# Patient Record
Sex: FEMALE | Race: WHITE | Employment: OTHER | URBAN - METROPOLITAN AREA
[De-identification: names, ages, dates, MRNs, and addresses within clinical notes are randomized per-mention and may not be internally consistent; named-entity substitution may affect disease eponyms.]

---

## 2017-01-26 PROBLEM — G56.02 CARPAL TUNNEL SYNDROME OF LEFT WRIST: Status: ACTIVE | Noted: 2017-01-26

## 2017-01-26 PROBLEM — M18.12 PRIMARY OSTEOARTHRITIS OF FIRST CARPOMETACARPAL JOINT OF LEFT HAND: Status: ACTIVE | Noted: 2017-01-26

## 2017-03-26 ENCOUNTER — OFFICE VISIT (OUTPATIENT)
Dept: FAMILY MEDICINE CLINIC | Facility: CLINIC | Age: 82
End: 2017-03-26

## 2017-03-26 VITALS
TEMPERATURE: 99 F | HEIGHT: 61 IN | BODY MASS INDEX: 30.21 KG/M2 | SYSTOLIC BLOOD PRESSURE: 136 MMHG | OXYGEN SATURATION: 98 % | HEART RATE: 68 BPM | RESPIRATION RATE: 12 BRPM | WEIGHT: 160 LBS | DIASTOLIC BLOOD PRESSURE: 84 MMHG

## 2017-03-26 DIAGNOSIS — J01.00 ACUTE MAXILLARY SINUSITIS, RECURRENCE NOT SPECIFIED: Primary | ICD-10-CM

## 2017-03-26 PROCEDURE — 99203 OFFICE O/P NEW LOW 30 MIN: CPT | Performed by: NURSE PRACTITIONER

## 2017-03-26 RX ORDER — FLUTICASONE PROPIONATE 50 MCG
2 SPRAY, SUSPENSION (ML) NASAL DAILY
Qty: 1 BOTTLE | Refills: 0 | Status: SHIPPED | OUTPATIENT
Start: 2017-03-26 | End: 2017-09-06

## 2017-03-26 RX ORDER — CEPHALEXIN 500 MG/1
CAPSULE ORAL
Refills: 0 | COMMUNITY
Start: 2017-03-22 | End: 2017-09-06

## 2017-03-26 RX ORDER — CEFDINIR 300 MG/1
300 CAPSULE ORAL 2 TIMES DAILY
Qty: 20 CAPSULE | Refills: 0 | Status: SHIPPED | OUTPATIENT
Start: 2017-03-26 | End: 2017-04-05 | Stop reason: ALTCHOICE

## 2017-03-26 NOTE — PROGRESS NOTES
CHIEF COMPLAINT:   Patient presents with:  Nasal Congestion  Cough      HPI:   Roderick Marroquin is a 80year old female who presents for sinus congestion for  4  days. Symptoms have been worsening since onset.  Sinus congestion/pain is described as a pressure Comment left shoulder replacement     OTHER SURGICAL HISTORY  1/2013    Comment right shoulder replaceemnt     KNEE REPLACEMENT SURGERY      Comment left  1999  right 2010      HIP SURGERY Right 7/8/16--Dr. Mary Clemons    Comment total hip replacement      Family Meds & Refills for this Visit:    Signed Prescriptions Disp Refills    Fluticasone Propionate 50 MCG/ACT Nasal Suspension 1 Bottle 0      Si sprays by Each Nare route daily.       cefdinir 300 MG Oral Cap 20 capsule 0      Sig: Take 1 capsule (300 mg · Over-the-counter decongestants may be used unless a similar medicine was prescribed. Nasal sprays work the fastest. Use one that contains phenylephrine or oxymetazoline. First blow the nose gently. Then use the spray.  Do not use these medicines more ofte © 1395-6552 The [address] [place] [address], [address]. All rights reserved. This information is not intended as a substitute for professional medical care. Always follow your healthcare professional's instructions.             The

## 2018-12-21 PROCEDURE — 82570 ASSAY OF URINE CREATININE: CPT | Performed by: FAMILY MEDICINE

## 2018-12-21 PROCEDURE — 82043 UR ALBUMIN QUANTITATIVE: CPT | Performed by: FAMILY MEDICINE

## 2018-12-21 PROCEDURE — 81001 URINALYSIS AUTO W/SCOPE: CPT | Performed by: FAMILY MEDICINE

## 2018-12-27 PROCEDURE — 81001 URINALYSIS AUTO W/SCOPE: CPT | Performed by: FAMILY MEDICINE

## 2019-02-06 PROCEDURE — 81003 URINALYSIS AUTO W/O SCOPE: CPT | Performed by: FAMILY MEDICINE

## 2019-03-12 ENCOUNTER — TELEPHONE (OUTPATIENT)
Dept: SURGERY | Facility: CLINIC | Age: 84
End: 2019-03-12

## 2019-03-19 NOTE — TELEPHONE ENCOUNTER
Pts daughter scheduled NP appt 3/20 with Dr Anderson Hernandez for knee pain-self referred, NP paperwork endorsed to [facility] bush folder in 2 [facility]

## 2019-03-20 ENCOUNTER — OFFICE VISIT (OUTPATIENT)
Dept: PAIN CLINIC | Facility: CLINIC | Age: 84
End: 2019-03-20
Payer: MEDICARE

## 2019-03-20 ENCOUNTER — TELEPHONE (OUTPATIENT)
Dept: PAIN CLINIC | Facility: CLINIC | Age: 84
End: 2019-03-20

## 2019-03-20 VITALS
BODY MASS INDEX: 30.21 KG/M2 | SYSTOLIC BLOOD PRESSURE: 160 MMHG | OXYGEN SATURATION: 97 % | HEART RATE: 70 BPM | DIASTOLIC BLOOD PRESSURE: 90 MMHG | WEIGHT: 160 LBS | HEIGHT: 61 IN

## 2019-03-20 DIAGNOSIS — M16.12 OSTEOARTHRITIS OF LEFT HIP, UNSPECIFIED OSTEOARTHRITIS TYPE: Primary | ICD-10-CM

## 2019-03-20 DIAGNOSIS — M17.12 OSTEOARTHRITIS OF LEFT KNEE, UNSPECIFIED OSTEOARTHRITIS TYPE: ICD-10-CM

## 2019-03-20 PROCEDURE — 99204 OFFICE O/P NEW MOD 45 MIN: CPT | Performed by: ANESTHESIOLOGY

## 2019-03-20 RX ORDER — ACETAMINOPHEN 500 MG
500 TABLET ORAL EVERY 6 HOURS PRN
Status: ON HOLD | COMMUNITY
End: 2019-07-15

## 2019-03-20 NOTE — PROGRESS NOTES
PHYSICAL EXAM:   Physical Exam  .    New patient here c/o left hip and left knee pain (knee replacement in 1999). Reported pain level of 4/10. Patient in room with daughter and , ok to hear PHI.     Location of Pain:     Date Pain Began:

## 2019-03-20 NOTE — PATIENT INSTRUCTIONS
Refill policies:    • Allow 2-3 business days for refills; controlled substances may take longer.   • Contact your pharmacy at least 5 days prior to running out of medication and have them send an electronic request or submit request through the “request re been approved by your insurer. Depending on your insurance carrier, approval may take 3-10 days. It is highly recommended patients contact their insurance carrier directly to determine coverage.   If test is done without insurance authorization, patient ma SEDATION        Appointment Date: 4/16/19,4/23/19   Check-In Time: 7:00 am for both    ** TO AVOID CANCELLATION AND/OR RESCHEDULING: PLEASE CALL [facility] PRE-PROCEDURE LINE -[phone] FOR DETAILED INSTRUCTIONS FIVE TO SEVEN DAYS PRIOR TO PROCEDURE** off for procedure      Medication:   Number of days you need to be off for the following medications:  • Aggrenox 10 days   • Agrylin (Anagrelide) 10 days   • Enbrel (Etanercept) 24 hours   • Fragmin (Dalteparin) 24 hours   • Pletal (Cilostazol) 7 days  • your blood sugar. Contact your primary care physician if your blood sugar rises as you may require some medication adjustment.         It is normal to have increased pain at injection site for up to 3-5 days after procedure, you can        use heat or ice

## 2019-03-20 NOTE — TELEPHONE ENCOUNTER
Holds placed on OR schedule. Please place cases with sedation.     Left hip joint: 4/16/19 at 0800    Left genicular nerve: 4/23/19 at 0800

## 2019-03-20 NOTE — PROGRESS NOTES
Spoke with patient and scheduled injections. Reviewed pre-op instructions. Patient verbalized understanding, no further questions at this time. Pre-op instructions provided to pt and pt's daughter in office.

## 2019-03-25 ENCOUNTER — TELEPHONE (OUTPATIENT)
Dept: SURGERY | Facility: CLINIC | Age: 84
End: 2019-03-25

## 2019-03-25 RX ORDER — DIAZEPAM 2 MG/1
TABLET ORAL
Qty: 2 TABLET | Refills: 0 | Status: SHIPPED
Start: 2019-03-25 | End: 2019-06-06 | Stop reason: ALTCHOICE

## 2019-03-25 NOTE — TELEPHONE ENCOUNTER
I have printed a script for valium 2mg, please call into the pharmacy. She will need to have a  taking her to and from the MRI.   Thanks

## 2019-03-25 NOTE — TELEPHONE ENCOUNTER
patient has made appointment for MRI for Wednesday 3/27/2019 @ 3:15 needs to have valium called in so she can take before the imaging.   Wants it called in to [facility] in [facility]

## 2019-03-25 NOTE — TELEPHONE ENCOUNTER
Spoke to pt to provide instructions for valium prior to MRI and need for a . Pt verbalized understanding.

## 2019-04-02 ENCOUNTER — TELEPHONE (OUTPATIENT)
Dept: SURGERY | Facility: CLINIC | Age: 84
End: 2019-04-02

## 2019-04-02 NOTE — TELEPHONE ENCOUNTER
Pt's daughter states pt completed MRI, report will be sent from [facility] via fax; daughter would like Dr Farideh Goetz to review MRI report & advise if he feels injections are still necessary once reviewed.  Daughter asking for call back w/provider's feedb

## 2019-04-04 NOTE — TELEPHONE ENCOUNTER
MRI left hip shows the followin. Severe joint space narrowing; osteoarthritis to the joint space  2. Labral tears; several surrounding the joint  3. Muscle tendonitis: gluteus medius  4. Mild greater trochanteric bursitis  5.  Partial tearing of conj

## 2019-04-04 NOTE — TELEPHONE ENCOUNTER
Daughter calling asking status of reviewing MRI. Does not want to bring mother if she is unable to get injections.   Please call daughter [phone]

## 2019-04-04 NOTE — TELEPHONE ENCOUNTER
Patient's daughter was notified of results and recommendations below. She verbalized understanding and was very appreciative of call.      Results sent to scanning

## 2019-04-05 NOTE — PROGRESS NOTES
Name: Princess Valle   : 3/4/1935   DOS: 3/20/2019     Chief complaint: Low back pain, left hip and knee joint pain    History of present illness:  Princess Valle is a 80year old female complaining of  pain in the lower back for many years but got signif diazepam 2 MG Oral Tab Take one tablet at bedtime the night prior to test, may repeat 45 minutes prior to test. Disp: 2 tablet Rfl: 0   amLODIPine Besylate 5 MG Oral Tab Take 1 tablet (5 mg total) by mouth daily.  Disp: 90 tablet Rfl: 3   cephALEXin 500 M problems. Vascular: Negative. Specifically denies phlebitis, DVT, PE, bleeding problems, hemophilia or any other vascular problems. Dermatology: Negative for all skin problems. Hematolgy/Lymph: negative for all the hematological problems.   Neuropsychiat area.  But severe tenderness over the left hip and left knee joint area. Flexion of the spine makes the pain better, extension and lateral rotation of the spine makes the pain worse.  Ronnies, thigh thrust, the [place] or [place], SI joint compression, [facility]

## 2019-04-11 ENCOUNTER — TELEPHONE (OUTPATIENT)
Dept: SURGERY | Facility: CLINIC | Age: 84
End: 2019-04-11

## 2019-04-11 NOTE — TELEPHONE ENCOUNTER
Spoke to patient, confirmed procedure date of 4/16/19 and to be checked in at outpatient registration at 7:00 am. Patient instructed to call pre-procedure line before procedure at [phone].  Patient instructed to call office if there are additional ques

## 2019-04-15 NOTE — TELEPHONE ENCOUNTER
Patient states she had a dental procedure 7 weeks ago, will this be a problem? Per Pain Service, no this is not an issue for tomorrow's injection. Pt informed.

## 2019-04-16 ENCOUNTER — HOSPITAL ENCOUNTER (OUTPATIENT)
Facility: HOSPITAL | Age: 84
Setting detail: HOSPITAL OUTPATIENT SURGERY
Discharge: HOME | End: 2019-04-16
Attending: ANESTHESIOLOGY | Admitting: ANESTHESIOLOGY
Payer: MEDICARE

## 2019-04-16 ENCOUNTER — APPOINTMENT (OUTPATIENT)
Dept: GENERAL RADIOLOGY | Facility: HOSPITAL | Age: 84
End: 2019-04-16
Attending: ANESTHESIOLOGY
Payer: MEDICARE

## 2019-04-16 ENCOUNTER — TELEPHONE (OUTPATIENT)
Dept: PAIN CLINIC | Facility: CLINIC | Age: 84
End: 2019-04-16

## 2019-04-16 VITALS
RESPIRATION RATE: 18 BRPM | SYSTOLIC BLOOD PRESSURE: 105 MMHG | OXYGEN SATURATION: 95 % | TEMPERATURE: 98 F | DIASTOLIC BLOOD PRESSURE: 47 MMHG | HEART RATE: 65 BPM

## 2019-04-16 DIAGNOSIS — M16.12 OSTEOARTHRITIS OF LEFT HIP, UNSPECIFIED OSTEOARTHRITIS TYPE: ICD-10-CM

## 2019-04-16 PROCEDURE — 3E0U3BZ INTRODUCTION OF ANESTHETIC AGENT INTO JOINTS, PERCUTANEOUS APPROACH: ICD-10-PCS | Performed by: ANESTHESIOLOGY

## 2019-04-16 PROCEDURE — 77002 NEEDLE LOCALIZATION BY XRAY: CPT | Performed by: ANESTHESIOLOGY

## 2019-04-16 PROCEDURE — 99152 MOD SED SAME PHYS/QHP 5/>YRS: CPT | Performed by: ANESTHESIOLOGY

## 2019-04-16 PROCEDURE — 3E0U33Z INTRODUCTION OF ANTI-INFLAMMATORY INTO JOINTS, PERCUTANEOUS APPROACH: ICD-10-PCS | Performed by: ANESTHESIOLOGY

## 2019-04-16 RX ORDER — SODIUM CHLORIDE, SODIUM LACTATE, POTASSIUM CHLORIDE, CALCIUM CHLORIDE 600; 310; 30; 20 MG/100ML; MG/100ML; MG/100ML; MG/100ML
100 INJECTION, SOLUTION INTRAVENOUS CONTINUOUS
Status: DISCONTINUED | OUTPATIENT
Start: 2019-04-16 | End: 2019-04-16

## 2019-04-16 RX ORDER — LIDOCAINE HYDROCHLORIDE 10 MG/ML
INJECTION, SOLUTION EPIDURAL; INFILTRATION; INTRACAUDAL; PERINEURAL AS NEEDED
Status: DISCONTINUED | OUTPATIENT
Start: 2019-04-16 | End: 2019-04-16

## 2019-04-16 RX ORDER — METHYLPREDNISOLONE ACETATE 40 MG/ML
INJECTION, SUSPENSION INTRA-ARTICULAR; INTRALESIONAL; INTRAMUSCULAR; SOFT TISSUE AS NEEDED
Status: DISCONTINUED | OUTPATIENT
Start: 2019-04-16 | End: 2019-04-16

## 2019-04-16 RX ORDER — ONDANSETRON 2 MG/ML
4 INJECTION INTRAMUSCULAR; INTRAVENOUS ONCE AS NEEDED
Status: DISCONTINUED | OUTPATIENT
Start: 2019-04-16 | End: 2019-04-16

## 2019-04-16 RX ORDER — MIDAZOLAM HYDROCHLORIDE 1 MG/ML
INJECTION INTRAMUSCULAR; INTRAVENOUS AS NEEDED
Status: DISCONTINUED | OUTPATIENT
Start: 2019-04-16 | End: 2019-04-16

## 2019-04-16 RX ORDER — DIPHENHYDRAMINE HYDROCHLORIDE 50 MG/ML
50 INJECTION INTRAMUSCULAR; INTRAVENOUS ONCE AS NEEDED
Status: DISCONTINUED | OUTPATIENT
Start: 2019-04-16 | End: 2019-04-16

## 2019-04-16 NOTE — TELEPHONE ENCOUNTER
Patient brought in MRI of left hip; advised pt and family I will upload it to PACS system tomorrow, and mail back once completed. Pt has scheduled injection next week.

## 2019-04-16 NOTE — H&P
History & Physical Examination    Patient Name: Princess Valle  MRN: [id number]  CSN: [id number]  YOB: 1935    Pre-Operative Diagnosis:  Osteoarthritis of left hip, unspecified osteoarthritis type [M16.12]    Present Illness: A 80year old femal of blood transfusion     with knee replacement 2010   • Hyperlipidemia    • Obesity    • Osteoarthritis      Past Surgical History:   Procedure Laterality Date   • ANESTH,SURGERY OF SHOULDER Bilateral    • CARPAL TUNNEL RELEASE Right    • COLONOSCOPY     •

## 2019-04-16 NOTE — OPERATIVE REPORT
[facility]  Operative Report  2019     Magen Miller Patient Status:  Hospital Outpatient Surgery    3/4/1935 MRN [id number]   [facility] ENDOSCOPY Attending No att. providers found   Hosp Day # 0 PCP Jeanna Rivas MD     Indicat the procedure very well. The patient had complete understanding of the risks and benefits of the procedure. Complications: None. Follow up: The patient will be followed in the pain clinic as needed basis.     Ruchi Prater MD

## 2019-04-18 ENCOUNTER — TELEPHONE (OUTPATIENT)
Dept: SURGERY | Facility: CLINIC | Age: 84
End: 2019-04-18

## 2019-04-18 NOTE — TELEPHONE ENCOUNTER
Pt would like to know if she needs to make any f/us since she is doing so well?  Please advise patient

## 2019-04-18 NOTE — TELEPHONE ENCOUNTER
Spoke to pt who states left knee is feeling better and feels pain was related to the hip issues she was having.  Asked pt if she would like to have procedure removed from OR schedule today or if she would prefer to wait until 4/19/19 or 4/22/19 to advise if

## 2019-04-18 NOTE — TELEPHONE ENCOUNTER
Left message for patient to call back to confirm procedure date of 4/23/19 with Dr Deana Anglin and to be checked in at outpatient registration at 7:00 am. Patient to be instructed to call pre-procedure line before procedure at [phone].  Patient to be instruct

## 2019-04-22 ENCOUNTER — TELEPHONE (OUTPATIENT)
Dept: SURGERY | Facility: CLINIC | Age: 84
End: 2019-04-22

## 2019-05-01 ENCOUNTER — OFFICE VISIT (OUTPATIENT)
Dept: PAIN CLINIC | Facility: CLINIC | Age: 84
End: 2019-05-01
Payer: MEDICARE

## 2019-05-01 VITALS
BODY MASS INDEX: 29.83 KG/M2 | DIASTOLIC BLOOD PRESSURE: 78 MMHG | WEIGHT: 158 LBS | SYSTOLIC BLOOD PRESSURE: 142 MMHG | HEIGHT: 61 IN | OXYGEN SATURATION: 98 % | HEART RATE: 71 BPM

## 2019-05-01 DIAGNOSIS — M16.12 PRIMARY OSTEOARTHRITIS OF LEFT HIP: Primary | ICD-10-CM

## 2019-05-01 PROCEDURE — 99214 OFFICE O/P EST MOD 30 MIN: CPT | Performed by: ANESTHESIOLOGY

## 2019-05-04 NOTE — PROGRESS NOTES
Name: Ro Banks   : 3/4/1935   DOS: 2019     Pain Clinic Follow Up Visit:   Ro Banks is a 80year old female who presents for recheck of her chronic hip pain. She is status post left hip joint injection.   She had complete pain relief for 3 (two) times daily.  Disp: 14 capsule Rfl: 0         EXAM:   /78 (BP Location: Right arm, Patient Position: Sitting, Cuff Size: adult)   Pulse 71   Ht 61\"   Wt 158 lb   SpO2 98%   BMI 29.85 kg/m²   Moderate tenderness over the left hip joint lumbar pa

## 2019-05-09 PROBLEM — M16.12 PRIMARY OSTEOARTHRITIS OF LEFT HIP: Status: ACTIVE | Noted: 2019-05-09

## 2019-06-24 ENCOUNTER — LABORATORY ENCOUNTER (OUTPATIENT)
Dept: LAB | Age: 84
End: 2019-06-24
Attending: ORTHOPAEDIC SURGERY
Payer: MEDICARE

## 2019-06-24 DIAGNOSIS — M16.12 PRIMARY OSTEOARTHRITIS OF LEFT HIP: ICD-10-CM

## 2019-06-24 PROCEDURE — 87081 CULTURE SCREEN ONLY: CPT

## 2019-06-24 PROCEDURE — 86901 BLOOD TYPING SEROLOGIC RH(D): CPT

## 2019-06-24 PROCEDURE — 86850 RBC ANTIBODY SCREEN: CPT

## 2019-06-24 PROCEDURE — 86900 BLOOD TYPING SEROLOGIC ABO: CPT

## 2019-06-26 PROCEDURE — 87086 URINE CULTURE/COLONY COUNT: CPT | Performed by: FAMILY MEDICINE

## 2019-06-26 PROCEDURE — 81001 URINALYSIS AUTO W/SCOPE: CPT | Performed by: FAMILY MEDICINE

## 2019-07-09 NOTE — CM/SW NOTE
Call from pt re: surgery 7/15 and need to go to rehab at d/c. She notes that her  is disabled and cannot help her. She had gone to [facility] in [facility] 3 yrs ago after surgery and this would be her preference again.   SW discussed that in order for M

## 2019-07-14 ENCOUNTER — ANESTHESIA EVENT (OUTPATIENT)
Dept: SURGERY | Facility: HOSPITAL | Age: 84
DRG: 470 | End: 2019-07-14
Payer: MEDICARE

## 2019-07-15 ENCOUNTER — ANESTHESIA (OUTPATIENT)
Dept: SURGERY | Facility: HOSPITAL | Age: 84
DRG: 470 | End: 2019-07-15
Payer: MEDICARE

## 2019-07-15 ENCOUNTER — APPOINTMENT (OUTPATIENT)
Dept: GENERAL RADIOLOGY | Facility: HOSPITAL | Age: 84
DRG: 470 | End: 2019-07-15
Attending: ORTHOPAEDIC SURGERY
Payer: MEDICARE

## 2019-07-15 ENCOUNTER — HOSPITAL ENCOUNTER (INPATIENT)
Facility: HOSPITAL | Age: 84
LOS: 2 days | Discharge: NURSING, REHAB OR LONG-TERM CARE | DRG: 470 | End: 2019-07-17
Attending: ORTHOPAEDIC SURGERY | Admitting: ORTHOPAEDIC SURGERY
Payer: MEDICARE

## 2019-07-15 DIAGNOSIS — Z96.652 HISTORY OF TOTAL KNEE ARTHROPLASTY, LEFT: ICD-10-CM

## 2019-07-15 DIAGNOSIS — M16.12 PRIMARY OSTEOARTHRITIS OF LEFT HIP: Primary | ICD-10-CM

## 2019-07-15 PROCEDURE — 97161 PT EVAL LOW COMPLEX 20 MIN: CPT

## 2019-07-15 PROCEDURE — 3E0T3BZ INTRODUCTION OF ANESTHETIC AGENT INTO PERIPHERAL NERVES AND PLEXI, PERCUTANEOUS APPROACH: ICD-10-PCS | Performed by: ANESTHESIOLOGY

## 2019-07-15 PROCEDURE — 88311 DECALCIFY TISSUE: CPT | Performed by: ORTHOPAEDIC SURGERY

## 2019-07-15 PROCEDURE — 0SRB01A REPLACEMENT OF LEFT HIP JOINT WITH METAL SYNTHETIC SUBSTITUTE, UNCEMENTED, OPEN APPROACH: ICD-10-PCS | Performed by: ORTHOPAEDIC SURGERY

## 2019-07-15 PROCEDURE — 76000 FLUOROSCOPY <1 HR PHYS/QHP: CPT | Performed by: ORTHOPAEDIC SURGERY

## 2019-07-15 PROCEDURE — 88304 TISSUE EXAM BY PATHOLOGIST: CPT | Performed by: ORTHOPAEDIC SURGERY

## 2019-07-15 PROCEDURE — 76942 ECHO GUIDE FOR BIOPSY: CPT | Performed by: ORTHOPAEDIC SURGERY

## 2019-07-15 PROCEDURE — 97530 THERAPEUTIC ACTIVITIES: CPT

## 2019-07-15 DEVICE — PINNACLE POROCOAT ACETABULAR SHELL SECTOR II 48MM OD
Type: IMPLANTABLE DEVICE | Site: HIP | Status: FUNCTIONAL
Brand: PINNACLE POROCOAT

## 2019-07-15 DEVICE — CORAIL HIP SYSTEM CEMENTLESS FEMORAL STEM 12/14 AMT 125 DEGREES KLA SIZE 10 HA COATED HIGH OFFSET COLLAR
Type: IMPLANTABLE DEVICE | Site: HIP | Status: FUNCTIONAL
Brand: CORAIL

## 2019-07-15 DEVICE — PINNACLE HIP SOLUTIONS ALTRX POLYETHYLENE ACETABULAR LINER NEUTRAL 32MM ID 48MM OD
Type: IMPLANTABLE DEVICE | Site: HIP | Status: FUNCTIONAL
Brand: PINNACLE ALTRX

## 2019-07-15 DEVICE — ARTICUL/EZE FEMORAL HEAD DIAMETER 32MM +1 12/14 TAPER
Type: IMPLANTABLE DEVICE | Site: HIP | Status: FUNCTIONAL
Brand: ARTICUL/EZE

## 2019-07-15 RX ORDER — ACETAMINOPHEN 325 MG/1
TABLET ORAL
Status: COMPLETED
Start: 2019-07-15 | End: 2019-07-15

## 2019-07-15 RX ORDER — ATORVASTATIN CALCIUM 20 MG/1
20 TABLET, FILM COATED ORAL
Status: DISCONTINUED | OUTPATIENT
Start: 2019-07-18 | End: 2019-07-17

## 2019-07-15 RX ORDER — DOCUSATE SODIUM 100 MG/1
100 CAPSULE, LIQUID FILLED ORAL 2 TIMES DAILY
Qty: 60 CAPSULE | Refills: 0 | Status: SHIPPED | OUTPATIENT
Start: 2019-07-15 | End: 2019-09-25 | Stop reason: ALTCHOICE

## 2019-07-15 RX ORDER — CARVEDILOL 6.25 MG/1
6.25 TABLET ORAL 2 TIMES DAILY WITH MEALS
Status: DISCONTINUED | OUTPATIENT
Start: 2019-07-15 | End: 2019-07-17

## 2019-07-15 RX ORDER — NALOXONE HYDROCHLORIDE 0.4 MG/ML
80 INJECTION, SOLUTION INTRAMUSCULAR; INTRAVENOUS; SUBCUTANEOUS AS NEEDED
Status: DISCONTINUED | OUTPATIENT
Start: 2019-07-15 | End: 2019-07-15 | Stop reason: HOSPADM

## 2019-07-15 RX ORDER — DIPHENHYDRAMINE HCL 25 MG
25 CAPSULE ORAL EVERY 4 HOURS PRN
Status: DISCONTINUED | OUTPATIENT
Start: 2019-07-15 | End: 2019-07-16

## 2019-07-15 RX ORDER — MIDAZOLAM HYDROCHLORIDE 1 MG/ML
1 INJECTION INTRAMUSCULAR; INTRAVENOUS EVERY 5 MIN PRN
Status: DISCONTINUED | OUTPATIENT
Start: 2019-07-15 | End: 2019-07-15 | Stop reason: HOSPADM

## 2019-07-15 RX ORDER — DOCUSATE SODIUM 100 MG/1
100 CAPSULE, LIQUID FILLED ORAL 2 TIMES DAILY
Status: DISCONTINUED | OUTPATIENT
Start: 2019-07-15 | End: 2019-07-17

## 2019-07-15 RX ORDER — HYDROMORPHONE HYDROCHLORIDE 1 MG/ML
0.4 INJECTION, SOLUTION INTRAMUSCULAR; INTRAVENOUS; SUBCUTANEOUS EVERY 5 MIN PRN
Status: DISCONTINUED | OUTPATIENT
Start: 2019-07-15 | End: 2019-07-15 | Stop reason: HOSPADM

## 2019-07-15 RX ORDER — SENNOSIDES 8.6 MG
17.2 TABLET ORAL NIGHTLY
Status: DISCONTINUED | OUTPATIENT
Start: 2019-07-15 | End: 2019-07-17

## 2019-07-15 RX ORDER — DIPHENHYDRAMINE HYDROCHLORIDE 50 MG/ML
12.5 INJECTION INTRAMUSCULAR; INTRAVENOUS EVERY 4 HOURS PRN
Status: DISCONTINUED | OUTPATIENT
Start: 2019-07-15 | End: 2019-07-16

## 2019-07-15 RX ORDER — ACETAMINOPHEN 500 MG
1000 TABLET ORAL ONCE
Status: DISCONTINUED | OUTPATIENT
Start: 2019-07-15 | End: 2019-07-15

## 2019-07-15 RX ORDER — CEFAZOLIN SODIUM/WATER 2 G/20 ML
2 SYRINGE (ML) INTRAVENOUS EVERY 8 HOURS
Status: COMPLETED | OUTPATIENT
Start: 2019-07-15 | End: 2019-07-16

## 2019-07-15 RX ORDER — POLYETHYLENE GLYCOL 3350 17 G/17G
17 POWDER, FOR SOLUTION ORAL DAILY PRN
Status: DISCONTINUED | OUTPATIENT
Start: 2019-07-15 | End: 2019-07-17

## 2019-07-15 RX ORDER — METOCLOPRAMIDE HYDROCHLORIDE 5 MG/ML
10 INJECTION INTRAMUSCULAR; INTRAVENOUS EVERY 6 HOURS PRN
Status: DISCONTINUED | OUTPATIENT
Start: 2019-07-15 | End: 2019-07-16

## 2019-07-15 RX ORDER — DIPHENHYDRAMINE HYDROCHLORIDE 50 MG/ML
12.5 INJECTION INTRAMUSCULAR; INTRAVENOUS AS NEEDED
Status: DISCONTINUED | OUTPATIENT
Start: 2019-07-15 | End: 2019-07-15 | Stop reason: HOSPADM

## 2019-07-15 RX ORDER — TIZANIDINE 2 MG/1
2 TABLET ORAL 3 TIMES DAILY PRN
Status: DISCONTINUED | OUTPATIENT
Start: 2019-07-15 | End: 2019-07-16

## 2019-07-15 RX ORDER — ONDANSETRON 2 MG/ML
4 INJECTION INTRAMUSCULAR; INTRAVENOUS EVERY 4 HOURS PRN
Status: DISCONTINUED | OUTPATIENT
Start: 2019-07-15 | End: 2019-07-17

## 2019-07-15 RX ORDER — OXYCODONE HYDROCHLORIDE 5 MG/1
5 TABLET ORAL EVERY 4 HOURS PRN
Status: DISCONTINUED | OUTPATIENT
Start: 2019-07-15 | End: 2019-07-16

## 2019-07-15 RX ORDER — HYDROMORPHONE HYDROCHLORIDE 1 MG/ML
0.4 INJECTION, SOLUTION INTRAMUSCULAR; INTRAVENOUS; SUBCUTANEOUS EVERY 2 HOUR PRN
Status: DISCONTINUED | OUTPATIENT
Start: 2019-07-15 | End: 2019-07-16

## 2019-07-15 RX ORDER — SODIUM CHLORIDE, SODIUM LACTATE, POTASSIUM CHLORIDE, CALCIUM CHLORIDE 600; 310; 30; 20 MG/100ML; MG/100ML; MG/100ML; MG/100ML
INJECTION, SOLUTION INTRAVENOUS CONTINUOUS
Status: DISCONTINUED | OUTPATIENT
Start: 2019-07-15 | End: 2019-07-17

## 2019-07-15 RX ORDER — OXYCODONE HYDROCHLORIDE 10 MG/1
10 TABLET ORAL EVERY 4 HOURS PRN
Status: DISCONTINUED | OUTPATIENT
Start: 2019-07-15 | End: 2019-07-16

## 2019-07-15 RX ORDER — SODIUM PHOSPHATE, DIBASIC AND SODIUM PHOSPHATE, MONOBASIC 7; 19 G/133ML; G/133ML
1 ENEMA RECTAL ONCE AS NEEDED
Status: DISCONTINUED | OUTPATIENT
Start: 2019-07-15 | End: 2019-07-17

## 2019-07-15 RX ORDER — SODIUM CHLORIDE, SODIUM LACTATE, POTASSIUM CHLORIDE, CALCIUM CHLORIDE 600; 310; 30; 20 MG/100ML; MG/100ML; MG/100ML; MG/100ML
INJECTION, SOLUTION INTRAVENOUS CONTINUOUS
Status: DISCONTINUED | OUTPATIENT
Start: 2019-07-15 | End: 2019-07-15 | Stop reason: HOSPADM

## 2019-07-15 RX ORDER — ACETAMINOPHEN 325 MG/1
650 TABLET ORAL ONCE
Status: COMPLETED | OUTPATIENT
Start: 2019-07-15 | End: 2019-07-15

## 2019-07-15 RX ORDER — PROCHLORPERAZINE EDISYLATE 5 MG/ML
10 INJECTION INTRAMUSCULAR; INTRAVENOUS EVERY 6 HOURS PRN
Status: DISCONTINUED | OUTPATIENT
Start: 2019-07-15 | End: 2019-07-17

## 2019-07-15 RX ORDER — CEFAZOLIN SODIUM/WATER 2 G/20 ML
2 SYRINGE (ML) INTRAVENOUS ONCE
Status: COMPLETED | OUTPATIENT
Start: 2019-07-15 | End: 2019-07-15

## 2019-07-15 RX ORDER — HYDROMORPHONE HYDROCHLORIDE 1 MG/ML
0.8 INJECTION, SOLUTION INTRAMUSCULAR; INTRAVENOUS; SUBCUTANEOUS EVERY 2 HOUR PRN
Status: DISCONTINUED | OUTPATIENT
Start: 2019-07-15 | End: 2019-07-16

## 2019-07-15 RX ORDER — BISACODYL 10 MG
10 SUPPOSITORY, RECTAL RECTAL
Status: DISCONTINUED | OUTPATIENT
Start: 2019-07-15 | End: 2019-07-17

## 2019-07-15 RX ORDER — HYDROMORPHONE HYDROCHLORIDE 1 MG/ML
0.2 INJECTION, SOLUTION INTRAMUSCULAR; INTRAVENOUS; SUBCUTANEOUS EVERY 2 HOUR PRN
Status: DISCONTINUED | OUTPATIENT
Start: 2019-07-15 | End: 2019-07-16

## 2019-07-15 RX ORDER — DIPHENHYDRAMINE HYDROCHLORIDE 50 MG/ML
25 INJECTION INTRAMUSCULAR; INTRAVENOUS ONCE AS NEEDED
Status: ACTIVE | OUTPATIENT
Start: 2019-07-15 | End: 2019-07-15

## 2019-07-15 RX ORDER — OXYCODONE HYDROCHLORIDE 15 MG/1
15 TABLET ORAL EVERY 4 HOURS PRN
Status: DISCONTINUED | OUTPATIENT
Start: 2019-07-15 | End: 2019-07-16

## 2019-07-15 RX ORDER — SODIUM CHLORIDE 9 MG/ML
INJECTION, SOLUTION INTRAVENOUS CONTINUOUS
Status: DISCONTINUED | OUTPATIENT
Start: 2019-07-15 | End: 2019-07-17

## 2019-07-15 RX ORDER — HYDROCODONE BITARTRATE AND ACETAMINOPHEN 5; 325 MG/1; MG/1
2 TABLET ORAL EVERY 4 HOURS PRN
Status: DISCONTINUED | OUTPATIENT
Start: 2019-07-16 | End: 2019-07-16

## 2019-07-15 RX ORDER — ATORVASTATIN CALCIUM 20 MG/1
20 TABLET, FILM COATED ORAL
COMMUNITY
End: 2020-02-25

## 2019-07-15 RX ORDER — CETIRIZINE HYDROCHLORIDE 10 MG/1
10 TABLET ORAL DAILY
Status: DISCONTINUED | OUTPATIENT
Start: 2019-07-15 | End: 2019-07-17

## 2019-07-15 RX ORDER — ONDANSETRON 2 MG/ML
4 INJECTION INTRAMUSCULAR; INTRAVENOUS AS NEEDED
Status: DISCONTINUED | OUTPATIENT
Start: 2019-07-15 | End: 2019-07-15 | Stop reason: HOSPADM

## 2019-07-15 RX ORDER — HYDROCODONE BITARTRATE AND ACETAMINOPHEN 5; 325 MG/1; MG/1
1 TABLET ORAL EVERY 4 HOURS PRN
Status: DISCONTINUED | OUTPATIENT
Start: 2019-07-16 | End: 2019-07-17

## 2019-07-15 RX ORDER — METOCLOPRAMIDE HYDROCHLORIDE 5 MG/ML
10 INJECTION INTRAMUSCULAR; INTRAVENOUS AS NEEDED
Status: DISCONTINUED | OUTPATIENT
Start: 2019-07-15 | End: 2019-07-15 | Stop reason: HOSPADM

## 2019-07-15 RX ORDER — AMLODIPINE BESYLATE 5 MG/1
5 TABLET ORAL DAILY
Status: DISCONTINUED | OUTPATIENT
Start: 2019-07-16 | End: 2019-07-17

## 2019-07-15 RX ORDER — ACETAMINOPHEN 325 MG/1
650 TABLET ORAL 4 TIMES DAILY
Status: DISPENSED | OUTPATIENT
Start: 2019-07-15 | End: 2019-07-16

## 2019-07-15 RX ORDER — HYDROCODONE BITARTRATE AND ACETAMINOPHEN 5; 325 MG/1; MG/1
1 TABLET ORAL EVERY 6 HOURS PRN
Qty: 40 TABLET | Refills: 0 | Status: SHIPPED | OUTPATIENT
Start: 2019-07-15 | End: 2019-08-23

## 2019-07-15 RX ORDER — MEPERIDINE HYDROCHLORIDE 25 MG/ML
12.5 INJECTION INTRAMUSCULAR; INTRAVENOUS; SUBCUTANEOUS AS NEEDED
Status: DISCONTINUED | OUTPATIENT
Start: 2019-07-15 | End: 2019-07-15 | Stop reason: HOSPADM

## 2019-07-15 RX ORDER — CARVEDILOL 6.25 MG/1
6.25 TABLET ORAL 2 TIMES DAILY WITH MEALS
COMMUNITY
End: 2019-09-11

## 2019-07-15 NOTE — H&P
Kenneth Go   6/26/2019 1:15 PM   Office Visit   MRN:  [id number]   Description: 80year old female Provider: Carol Merchant MD Department: [facility] Sheet     Click to print Kranthi Wynnee 708 for scanning     Office Visi Pre-Op Exam: Pt present for clearance for LT anterior Hip Replacement on 07/15/2019 by Dr Kishor Guzman at [facility] in [place].     /72   Pulse 73   Temp 98.2 °F (36.8 °C) (Oral)   Resp 16   Ht 5' 1\"   Wt 153 lb 6 oz   LMP  (LMP Unknown)   SpO2 93% Lab Results   Component Value Date     HDL 71 12/21/2018     HDL 72 06/15/2018     HDL 80 12/04/2017            Lab Results   Component Value Date     LDL 55 12/21/2018     LDL 62 06/15/2018     LDL 50 12/04/2017            Lab Results   Component Value Da CARVEDILOL 6.25 MG Oral Tab TAKE 1 TABLET BY MOUTH TWICE A DAY WITH FOOD Disp: 180 tablet Rfl: 0   ATORVASTATIN 20 MG Oral Tab TAKE 1 TABLET BY MOUTH TWICE A WEEK.  Disp: 30 tablet Rfl: 0   acetaminophen 500 MG Oral Tab Take 500 mg by mouth every 6 (six) ho • Hypertension Father     • Hypertension Mother     • Hypertension Brother         Social History    Tobacco Use      Smoking status: Never Smoker      Smokeless tobacco: Never Used    Alcohol use: No      Alcohol/week: 0.0 oz      Comment: very rare     Pre-op testing  -     CARD NUC STRESS TEST LEXISCAN (CPT=93015/10239/); Future     Microscopic hematuria        Multiple medical problems reviewed today. Patient should continue current medications as reviewed.   Common side effects of medications re The above referenced H&P was reviewed by Sebastián Martin MD on 7/15/2019, the patient was examined and no significant changes have occurred in the patient's condition since the H&P was performed.  I discussed with the patient and/or legal representative the po

## 2019-07-15 NOTE — OPERATIVE REPORT
[address] REPLACEMENT OPERATIVE REPORT    DATE OF SURGERY 7/15/2019    Lesia Jarquin       [id number]     3/4/1935    PRE-OP DX:  LEFT HIP PRIMARY OSTEOARTHRITIS  POST-OP DX:  LEFT HIP PRIMARY OSTEOARTHRITIS  PROCEDURE:  DIRECT ANTERIOR L DEGENERATIVE CHANGES WERE NOTED. FEMORAL NECK OSTEOTOMY WAS MADE. FEMORAL HEAD WAS REMOVED WITH A CORK SCREW. POSTERIOR AND INFERIOR ACETABULAR RETRACTORS WERE PLACED CAREFULLY. GOOD ACETABULAR EXPOSURE WAS OBTAINED. LABRAL TISSUE WAS EXCISED.   MED CONDITION. HIP WAS FLEXED TO 90 AND ADD/IR TO 45 DEG. HIP WAS FULLY EXTENDED AND ER TO 90. HIP WAS FELT TO BE STABLE WITH GOOD TENSION. ALL THE TRIAL IMPLANTS WERE REMOVED. WOUND WAS IRRIGATED COPIOUSLY. HEMOSTASIS WAS OBTAINED.   ACETABULUM WAS REEXP

## 2019-07-15 NOTE — CONSULTS
[facility] Hospitalist Initial Consult       Reason for consult: Medical Management sp ALINE      History of Present Illness: Patient is a 80year old female with PMH sig for HTN, HL  who presents sp ALINE.    They tolerated the procedure well without any immediate co shoulder replacement    • OTHER SURGICAL HISTORY  1/2013    right shoulder replaceemnt    • THYROIDECTOMY  1970's    partial      Social History    Tobacco Use      Smoking status: Never Smoker      Smokeless tobacco: Never Used    Alcohol use: No      Alc AST, ALB, AMYLASE, LIPASE, LDH in the last 168 hours. Invalid input(s): ALPHOS, TBIL, DBIL, TPROT    No results for input(s): PGLU in the last 168 hours. No results for input(s): TROP in the last 168 hours.     ASSESSMENT / PLAN:    sp TKA  - Plan per

## 2019-07-15 NOTE — PLAN OF CARE
Post op plan of care discussed at bedside this afternoon. Left hip aquacell dressing clean and dry. Gel ice in place. Scd's on. IVF infusing as ordered, post op bag completing. DTV. Tolerating water. Denies need for pain med at present.  Abd pillow between

## 2019-07-15 NOTE — ANESTHESIA POSTPROCEDURE EVALUATION
Senthil Chris Patient Status:  Surgery Admit - Inpt   Age/Gender 80year old female MRN [id number]   [facility] SURGERY Attending Marnia Hanna MD   Hosp Day # 0 PCP Mac Mcmahon MD       Anesthesia Post-op Note    Procedur

## 2019-07-15 NOTE — PROGRESS NOTES
Alicia Stevens   6/4/2019 2:20 PM   Office Visit   MRN:  [id number]   Description: 80year old female Provider: Christiana Vivar MD Department: [facility]     Scanning Cover Sheet     Click to print Kranthi Circe 852 for scanning     Office V cephALEXin 500 MG Oral Cap Take 1 capsule (500 mg total) by mouth 2 (two) times daily.  Disp: 14 capsule Rfl: 0   ferrous sulfate 325 (65 FE) MG Oral Tab EC Take 1 tablet (325 mg total) by mouth daily with breakfast. Disp: 15 tablet Rfl: 0   loratadine 10 M A comprehensive review of systems was negative. No back pain, no pain shooting below knee. No numbness or tingling.     Physical Exam:  General: Alert, orientated x3. Affect is normal.  No apparent distress. Extremities:  Low back exam is benign.   Left Patient’s diagnosis and treatment options were reviewed. What osteoarthritis is and severity of this disease was discussed.    Conservative care with symptomatic management including weight control, physical exercises/therapy, medications, injection option

## 2019-07-15 NOTE — PHYSICAL THERAPY NOTE
PHYSICAL THERAPY HIP EVALUATION - INPATIENT     Room Number: 365/365-A  Evaluation Date: 7/15/2019  Type of Evaluation: Initial  Physician Order: PT Eval and Treat    Presenting Problem: s/p L THR  Reason for Therapy: Mobility Dysfunction and Discharge Cha and functional mobility PTA. SUBJECTIVE  \"It feels better on my back to be sitting up. \"    Patient self-stated goal is to go to rehab for 10 days and then return home.     OBJECTIVE  Precautions: ALINE - anterior  Fall Risk: High fall risk    WEIGHT BEAR Total       AM-PAC Score:  Raw Score: 15   Approx Degree of Impairment: 57.7%   Standardized Score (AM-PAC Scale): 39.45   CMS Modifier (G-Code): CK    FUNCTIONAL ABILITY STATUS  Gait Assessment   Gait Assistance: Not tested                   Skilled [facility] and gait. The patient is below baseline and would benefit from skilled inpatient PT to address the above deficits to assist patient in returning to prior to level of function.   DISCHARGE RECOMMENDATIONS  PT Discharge Recommendations: Sub-acute rehabilitat

## 2019-07-15 NOTE — ANESTHESIA PREPROCEDURE EVALUATION
PRE-OP EVALUATION    Patient Name: Juan Ramon Che    Pre-op Diagnosis: RIGHT HIP OSTEOARTHROSIS    Procedure(s):  RIGHT ANTERIOR TOTAL HIP REPLACEMENT    Surgeon(s) and Role:     Erika Jasso MD - Primary    Pre-op vitals reviewed.           Current medica problem      intermittent back pain  • Cataract      bilateral  • Disorder of thyroid      partial thyroidectomy  • Essential hypertension    • Hearing impairment      bilateral aids-doesn't wear  • History of blood transfusion      with knee replacement 2 history. Pulmonary    Pulmonary exam normal.                 Other findings            ASA: 2   Plan: spinal, general and regional  NPO status verified and   Patient has taken beta blockers in last 24 hours.   Post-procedure pain management plan dis

## 2019-07-16 LAB
CREAT BLD-MCNC: 0.8 MG/DL (ref 0.55–1.02)
DEPRECATED RDW RBC AUTO: 48.5 FL (ref 35.1–46.3)
ERYTHROCYTE [DISTWIDTH] IN BLOOD BY AUTOMATED COUNT: 13.3 % (ref 11–15)
HCT VFR BLD AUTO: 32.8 % (ref 35–48)
HGB BLD-MCNC: 10.2 G/DL (ref 12–16)
MCH RBC QN AUTO: 30.4 PG (ref 26–34)
MCHC RBC AUTO-ENTMCNC: 31.1 G/DL (ref 31–37)
MCV RBC AUTO: 97.9 FL (ref 80–100)
PLATELET # BLD AUTO: 137 10(3)UL (ref 150–450)
RBC # BLD AUTO: 3.35 X10(6)UL (ref 3.8–5.3)
WBC # BLD AUTO: 9.5 X10(3) UL (ref 4–11)

## 2019-07-16 PROCEDURE — 97150 GROUP THERAPEUTIC PROCEDURES: CPT

## 2019-07-16 PROCEDURE — 97116 GAIT TRAINING THERAPY: CPT

## 2019-07-16 PROCEDURE — 82565 ASSAY OF CREATININE: CPT | Performed by: ORTHOPAEDIC SURGERY

## 2019-07-16 PROCEDURE — 97535 SELF CARE MNGMENT TRAINING: CPT

## 2019-07-16 PROCEDURE — 85027 COMPLETE CBC AUTOMATED: CPT | Performed by: ORTHOPAEDIC SURGERY

## 2019-07-16 PROCEDURE — 97165 OT EVAL LOW COMPLEX 30 MIN: CPT

## 2019-07-16 RX ORDER — TIZANIDINE 2 MG/1
1 TABLET ORAL 3 TIMES DAILY PRN
Status: DISCONTINUED | OUTPATIENT
Start: 2019-07-16 | End: 2019-07-17

## 2019-07-16 RX ORDER — HYDROMORPHONE HYDROCHLORIDE 1 MG/ML
0.2 INJECTION, SOLUTION INTRAMUSCULAR; INTRAVENOUS; SUBCUTANEOUS EVERY 2 HOUR PRN
Status: DISCONTINUED | OUTPATIENT
Start: 2019-07-16 | End: 2019-07-17

## 2019-07-16 RX ORDER — ACETAMINOPHEN 325 MG/1
650 TABLET ORAL EVERY 4 HOURS PRN
Status: DISCONTINUED | OUTPATIENT
Start: 2019-07-16 | End: 2019-07-17

## 2019-07-16 RX ORDER — METOCLOPRAMIDE HYDROCHLORIDE 5 MG/ML
5 INJECTION INTRAMUSCULAR; INTRAVENOUS EVERY 6 HOURS PRN
Status: DISCONTINUED | OUTPATIENT
Start: 2019-07-16 | End: 2019-07-17

## 2019-07-16 NOTE — CM/SW NOTE
07/16/19 0900   CM/SW Referral Data   Referral Source Physician   Reason for Referral Discharge planning   Informant Patient   Pertinent Medical Hx   Primary Care Physician Name Kana Pencil   Patient Info   Patient's Mental Status Alert;Oriented   Pa

## 2019-07-16 NOTE — CM/SW NOTE
Call back from [place] [facility]. They will be able to accept pt and will have bed availability tomorrow. Pt updated. Discussed [facility] transport vs family transport. She is aware she would be billed for [address] [place] [address]. She will let me know.      Shima Hillman

## 2019-07-16 NOTE — OCCUPATIONAL THERAPY NOTE
OCCUPATIONAL THERAPY EVALUATION - INPATIENT     Room Number: 365/365-A   Evaluation Date: 7/16/2019  Type of Evaluation: Initial  Presenting Problem: s/p L ALINE 7/15/19    Physician Order: IP Consult to Occupational Therapy  Reason for Therapy: ADL/IADL Dys height toilet  Shower/Tub and Equipment: Walk-in shower; Shower chair;Grab bar  Other Equipment: Reacher;Sock aid    Occupation/Status: Retired     Drives: Yes  Patient Regularly Uses: Glasses; Hearing aides    Prior Level of Function: Patient reports indepe Degree of Impairment: 53.32%  Standardized Score (AM-PAC Scale): 35.96  CMS Modifier (G-Code): CK    FUNCTIONAL TRANSFER ASSESSMENT  Supine to Sit : Not tested  Sit to Stand: Minimum assistance    Skilled Therapy Provided:  Activities performed this session with this level of impairment often benefit from MAXIMILIANO at discharge. The patient is below her baseline and would benefit from continued skilled OT to address the activity limitations identified by the AM-PAC \"6 clicks\".  Subacute rehab is recommended at dis

## 2019-07-16 NOTE — PLAN OF CARE
A&Ox4. VSS. Pain well managed with PO pain medications. Voids without difficulties. Eliquis. [place] no aids. Denies numbness and tingling to BLE. JACKIE HOGAN. Gel ice and abductor pillow in place. Fall protocol initiated. Will continue to monitor.

## 2019-07-16 NOTE — PLAN OF CARE
Pt A&Ox4. On ra  & scds in place. voiding in bathroom up with min-mod assist & walker. Bs active, passing gas, given mom this am. Aquacell clean dry and intact. Vss. Pain controlled minimally on pain meds.  Pt verbalized understanding of poc & call dont

## 2019-07-16 NOTE — PHYSICAL THERAPY NOTE
PHYSICAL THERAPY HIP TREATMENT NOTE - INPATIENT      Room Number: 365/365-A     Session: 1 and 2  Number of Visits to Meet Established Goals: 3    Presenting Problem: s/p L THR    Problem List  Active Problems:    * No active hospital problems.  *      Past Sitting: Fair  Dynamic Sitting: Fair -  Static Standing: Poor +  Dynamic Standing: Poor  ACTIVITY TOLERANCE                         O2 WALK                  AM-PAC '6-Clicks' INPATIENT SHORT FORM - BASIC MOBILITY  How much difficulty does the patient curre education.        Exercises AM Session PM Session   Ankle Pumps     10 reps 15 reps   Quad Sets 10 reps 15 reps   Glut Sets 10 reps 15 reps   Hip Abd/Add 10 reps 15 reps   Heel slides 10 reps 15 reps   Saq 10 reps 15 reps   Sitting Knee Extension 10 reps 15 assistance level: supervison      Goal #3     Patient is able to ambulate 250 feet with assistive device at assistance level: modified independent    Goal #4     Patient will negotiate 4 stairs/one curb w/ assistive device and supervision   Goal #5     Anabelle Rey

## 2019-07-16 NOTE — PROGRESS NOTES
Pharmacy Note: Renal dose adjustment for Metoclopramide (Reglan)  Saroj Wilson has been prescribed Metoclopramide (Reglan) 10 mg every 6 hours as needed. Estimated Creatinine Clearance: 39.5 mL/min (based on SCr of 0.8 mg/dL).     Her calculated creatin

## 2019-07-16 NOTE — PROGRESS NOTES
Senthil Chris Patient Status:  Inpatient    3/4/1935 MRN [id number]   [facility] 3SW-A Attending Kia Agrawal MD   Hosp Day # 1 PCP Finn Lake MD         S:  Patient doing well. No nausea. No SOB, CP or calf pain.   P

## 2019-07-16 NOTE — PROGRESS NOTES
[facility] Hospitalist Progress Note                                                                   Senthil Chris  3/4/1935    SUBJECTIVE: seen prior to PT. Doing well. Pain controlled.   Anurag Sanchez Problems:    * No active hospital problems. *        sp TKA  - Plan per ortho. Continue PT/OT. Pain is currently controlled. eliquis for DVT prophylaxis.      Acute on chronic pain  - cont IV narctotics as needed and monitor for signs of complication

## 2019-07-17 VITALS
WEIGHT: 143.19 LBS | RESPIRATION RATE: 18 BRPM | HEIGHT: 61 IN | DIASTOLIC BLOOD PRESSURE: 47 MMHG | TEMPERATURE: 98 F | OXYGEN SATURATION: 93 % | BODY MASS INDEX: 27.03 KG/M2 | SYSTOLIC BLOOD PRESSURE: 131 MMHG | HEART RATE: 72 BPM

## 2019-07-17 LAB
DEPRECATED RDW RBC AUTO: 48.8 FL (ref 35.1–46.3)
ERYTHROCYTE [DISTWIDTH] IN BLOOD BY AUTOMATED COUNT: 13.6 % (ref 11–15)
HCT VFR BLD AUTO: 28.6 % (ref 35–48)
HGB BLD-MCNC: 9.1 G/DL (ref 12–16)
MCH RBC QN AUTO: 30.8 PG (ref 26–34)
MCHC RBC AUTO-ENTMCNC: 31.8 G/DL (ref 31–37)
MCV RBC AUTO: 96.9 FL (ref 80–100)
PLATELET # BLD AUTO: 127 10(3)UL (ref 150–450)
RBC # BLD AUTO: 2.95 X10(6)UL (ref 3.8–5.3)
WBC # BLD AUTO: 10.1 X10(3) UL (ref 4–11)

## 2019-07-17 PROCEDURE — 85027 COMPLETE CBC AUTOMATED: CPT | Performed by: ORTHOPAEDIC SURGERY

## 2019-07-17 PROCEDURE — 97116 GAIT TRAINING THERAPY: CPT

## 2019-07-17 PROCEDURE — 97150 GROUP THERAPEUTIC PROCEDURES: CPT

## 2019-07-17 NOTE — OCCUPATIONAL THERAPY NOTE
Attempted to see patient for OT services this pm, however patient's meal just arrived, patient requesting to eat as discharging soon. Will reattempt as able.

## 2019-07-17 NOTE — PHYSICAL THERAPY NOTE
PHYSICAL THERAPY HIP TREATMENT NOTE - INPATIENT      Room Number: 365/365-A     Session: 3  Number of Visits to Meet Established Goals: 3    Presenting Problem: s/p L THR    Problem List  Active Problems:    * No active hospital problems.  *      Past Medic 6  Location: L hip  Management Techniques: Body mechanics; Activity promotion;Breathing techniques;Repositioning    BALANCE  Static Sitting: Fair -  Dynamic Sitting: Fair -  Static Standing: Fair -  Dynamic Standing: Poor +  ACTIVITY TOLERANCE 20 reps 0 reps   Forward, back steps 20 reps 0 reps   Short Squats 20 reps 0 reps     Comments:  Pt participated in group session, tolerance was good.    was present no   is a therapist     Patient End of Session: Up in chair;Needs met;RN aware of

## 2019-07-17 NOTE — PROGRESS NOTES
Orthopedic surgery progress note    Beryl Navarrete Patient Status:  Inpatient    3/4/1935 MRN [id number]   [facility] 3SW-A Attending Jose Alfredo Whaley MD   Hosp Day # 2 PCP Cheyenne Doran MD       Subjective:  Didn't feel well yesterday.   Had mo

## 2019-07-17 NOTE — CM/SW NOTE
07/17/19 1300   Discharge disposition   Expected discharge disposition Skilled Nurs   Name of Rene Terry   Discharge transportation [facility]

## 2019-07-17 NOTE — CM/SW NOTE
Discussed pt during daily rounds. Per RN Tonia Mota, pt should be cleared by MDs for discharge today. Pt would like to use [facility] transport and we discussed that she would be billed for this service. Requested [facility] at 2pm with [phone].

## 2019-07-17 NOTE — PROGRESS NOTES
[facility] Hospitalist Progress Note                                                                   Senthil Chris  3/4/1935    S: pt doing well. No cp, sob, n/v or dizziness.       OBJECTIVE:  Te

## 2019-07-17 NOTE — PLAN OF CARE
Problem: Patient/Family Goals  Goal: Patient/Family Long Term Goal  Description  Patient's Long Term Goal: \"Be able to resume activities at home with less pain\"    Interventions:  - Take pain meds as needed, follow activity guidelines and use assistive Manage/alleviate anxiety  - Utilize distraction and/or relaxation techniques  - Monitor for opioid side effects  - Notify MD/LIP if interventions unsuccessful or patient reports new pain  - Anticipate increased pain with activity and pre-medicate as approp

## 2019-07-17 NOTE — PROGRESS NOTES
D/c plan of care discussed with patient. IV removed, site intact. All personal belongings sent with her. [place] script and ambulance form and all paperwork sent along with her.  Report called to [facility]

## 2019-07-17 NOTE — PROGRESS NOTES
Acute Pain Service    Post Op Day 2 Ortho Note    Assessed patient in chair. Patient rates pain 0/10 at rest and increases with activity. Patient states pain is better today and Chris Burgess is working well to manage pain; denies itching/nausea/dizziness.     Consuelo

## 2019-07-17 NOTE — PLAN OF CARE
Plan of care discussed with patient at bedside this am. D/c plan for today to [facility] if ok with hospitlaist. Eating well, voiding freely. Tolerating norco for pain. Script on chart and will be sent with patient.  Per patient, she wants to go via [facility],

## 2019-07-19 NOTE — DISCHARGE SUMMARY
Discharge Summary  Patient ID:  Andres Barraza  [id number]  80year old  3/4/1935    Admit date: 7/15/2019    Discharge date and time: 7/17/19    Attending Physician: No att. providers found     Reason for admission: left hip primary OA    Discharge Diagnose R-0      CONTINUE these medications which have CHANGED    aspirin 81 MG Oral Tab  Take 1 tablet (81 mg total) by mouth daily.  RESUME WHEN OK PER SURGEON, Historical, R-0      CONTINUE these medications which have NOT CHANGED    atorvastatin 20 MG Oral Tab

## 2019-07-31 PROBLEM — Z96.642 STATUS POST TOTAL REPLACEMENT OF LEFT HIP: Status: ACTIVE | Noted: 2019-07-31

## 2019-08-23 PROBLEM — R06.00 DYSPNEA ON EXERTION: Status: ACTIVE | Noted: 2019-08-23

## 2019-08-23 PROBLEM — I50.32 CHRONIC DIASTOLIC CONGESTIVE HEART FAILURE (HCC): Status: ACTIVE | Noted: 2019-08-23

## 2020-02-21 PROBLEM — E78.2 MIXED HYPERLIPIDEMIA: Status: ACTIVE | Noted: 2020-02-21

## 2020-09-10 PROBLEM — I48.0 PAF (PAROXYSMAL ATRIAL FIBRILLATION) (HCC): Status: ACTIVE | Noted: 2020-09-10

## 2020-09-10 PROBLEM — I10 ESSENTIAL HYPERTENSION: Status: ACTIVE | Noted: 2020-09-10

## 2021-01-05 PROBLEM — F32.1 CURRENT MODERATE EPISODE OF MAJOR DEPRESSIVE DISORDER WITHOUT PRIOR EPISODE (HCC): Status: ACTIVE | Noted: 2021-01-05

## 2021-03-02 PROBLEM — I70.0 THORACIC AORTA ATHEROSCLEROSIS (HCC): Status: ACTIVE | Noted: 2021-03-02

## 2021-03-02 PROBLEM — D68.69 HYPERCOAGULABLE STATE, SECONDARY (HCC): Status: ACTIVE | Noted: 2021-03-02

## 2021-03-02 PROBLEM — D69.6 THROMBOCYTOPENIA (HCC): Status: ACTIVE | Noted: 2021-03-02

## 2021-03-02 PROBLEM — I77.819 AORTIC ECTASIA (HCC): Status: ACTIVE | Noted: 2021-03-02

## 2021-03-02 PROBLEM — G31.9 CEREBRAL ATROPHY (HCC): Status: ACTIVE | Noted: 2021-03-02

## 2021-03-08 PROBLEM — N18.30 STAGE 3 CHRONIC KIDNEY DISEASE, UNSPECIFIED WHETHER STAGE 3A OR 3B CKD (HCC): Status: ACTIVE | Noted: 2021-03-08

## (undated) DEVICE — HOOD, PEEL-AWAY: Brand: FLYTE

## (undated) DEVICE — SUTURE VICRYL 2-0 CP-1

## (undated) DEVICE — SPECIMEN CONTAINER,POSITIVE SEAL INDICATOR, OR PACKAGED: Brand: PRECISION

## (undated) DEVICE — Device

## (undated) DEVICE — BLADE ELECTRODE: Brand: EDGE

## (undated) DEVICE — POSITIONER OR KT PT CR

## (undated) DEVICE — BANDAID COVERLET 1X3

## (undated) DEVICE — Device: Brand: BOOT LINER, DISPOSABLE

## (undated) DEVICE — CODMAN® INTEGRATED BIPOLAR CORD AND TUBING SET FLYING LEADS, ROTARY PUMP: Brand: CODMAN®

## (undated) DEVICE — PILLOW ABDUCTION HIP SM

## (undated) DEVICE — SHEET,DRAPE,70X100,STERILE: Brand: MEDLINE

## (undated) DEVICE — SUTURE VICRYL 1 CPX

## (undated) DEVICE — REMOVER DURAPREP 3M

## (undated) DEVICE — GLOVE SURG SENSICARE SZ 7-1/2

## (undated) DEVICE — SUTURE ETHIBOND 5 CCS

## (undated) DEVICE — KENDALL SCD EXPRESS SLEEVES, KNEE LENGTH, MEDIUM: Brand: KENDALL SCD

## (undated) DEVICE — STERILE POLYISOPRENE POWDER-FREE SURGICAL GLOVES: Brand: PROTEXIS

## (undated) DEVICE — WRAP COOLING HIP W/ICE PILLOW

## (undated) DEVICE — DECANTER BAG 9": Brand: MEDLINE INDUSTRIES, INC.

## (undated) DEVICE — GLOVE RADIATION SZ 8-1/2

## (undated) DEVICE — PADDING CAST COTTON  4

## (undated) DEVICE — 3M™ IOBAN™ 2 ANTIMICROBIAL INCISE DRAPE 6650EZ: Brand: IOBAN™ 2

## (undated) DEVICE — GOWN SURG AERO CHROME XXL

## (undated) DEVICE — DRAPE C-ARM UNIVERSAL

## (undated) DEVICE — ANTERIOR HIP: Brand: MEDLINE INDUSTRIES, INC.

## (undated) DEVICE — PAIN TRAY: Brand: MEDLINE INDUSTRIES, INC.

## (undated) DEVICE — Device: Brand: STABLECUT®

## (undated) DEVICE — Device: Brand: PLUMEPEN

## (undated) DEVICE — NEEDLE SPINAL 22X3-1/2 BLK

## (undated) DEVICE — GLOVE SURG SENSICARE SZ 6-1/2

## (undated) DEVICE — DRESSING AQUACEL AG 3.5 X 10

## (undated) NOTE — IP AVS SNAPSHOT
[address]            (For Outpatient Use Only) Initial Admit Date: 7/15/2019   Inpt/Obs Admit Date: Inpt: 7/15/19 / Obs: N/A   Discharge Date:    Sheila Chowdhury:  [de-identified]   MRN: [de-identified]   CSN: [id number]   CEID: [id number] Subscriber ID:  Pt Rel to Subscriber:    Hospital Account Financial Class: Medicare    July 17, 2019

## (undated) NOTE — MR AVS SNAPSHOT
[address] [facility] [address] [phone]               Thank you for choosing us for your health care visit with TADEO Abebe.   We are glad to serve you and happy to provide you with this summary of your · An expectorant containing guaifenesin may help thin the mucus and promote drainage from the sinuses. · Over-the-counter decongestants may be used unless a similar medicine was prescribed.  Nasal sprays work the fastest. Use one that contains phenylephrin © 8777-4792 [address], [address]. All rights reserved. This information is not intended as a substitute for professional medical care. Always follow your healthcare professional's instructions.              [phone]     Phone:  [phone]    - cefdinir 300 MG Caps  - Fluticasone Propionate 50 MCG/ACT Susp            MyChart     Visit MyChart  You can access your MyChart to more actively manage your health care and view more details from this visit

## (undated) NOTE — IP AVS SNAPSHOT
Patient Demographics     Address  [address] Phone  [phone] [facility])  [phone] (Work)  [phone] (Mobile) *Preferred*      Emergency Contact(s)     Name Relation Home Work [employer] Spouse [phone]  [phone] Continue to use inspiratory spirometer at home. Continue to perform active ankle pumps at home. Medications:  Eliquis 2.5 mg 1 tab twice daily: This is a blood thinner to prevent blood clot.   Do not take with other blood thinners or with anti-inflamma Return to work  ? Usually allowed after four to six weeks. Discuss specific work activities with your surgeon. Restrictions  ? For hip replacement surgery, follow instructions provided by physical therapy    No smoking  ? Avoid smoking.  It is kn physician. ? Review anticoagulant education information sheet provided. Discomfort  ? Surgical discomfort is normal for one to two months. ? Have realistic goals and keep a positive outlook. ?  Keep pain manageable; pain should not disrupt your sleep ? Good hand washing is important. Everyone should wash their hands or use hand  as soon as they walk in your house-whether they live there or are visiting. ? Keep bed linen/clothing freshly laundered.   ? Do not allow others or pets to touch your ? Increased pain at incision not relieved by pain medication. Signs of Possible Dislocation  ? Increased severe leg or groin pain  ? Turning in or out of surgical leg that is new  ? Increased numbness, tingling to leg  ?  Inability to walk or put KEVIN Bell of each other and do not give you enough room. SPECIAL  INSTRUCTIONS:    View hip discharge education at www.[facility].org/ortho-spine. Choose after surgery information. Tubigrip (compression sleeve) for HIPS    ?  All patients should wear the comp Commonly known as:  ELIQUIS  Next dose due: Tonight 07/17/19      Take 1 tablet (2.5 mg total) by mouth 2 (two) times daily. Stop taking on:  8/14/2019   Jag Morris PA-C         aspirin 81 MG Tabs      Take 1 tablet (81 mg total) by mouth daily. [id number] HYDROcodone-acetaminophen (NORCO) 5-325 MG per tab 1 tablet (Or Linked Group #1) 07/16/19 1710 Given      [id number] HYDROcodone-acetaminophen (NORCO) 5-325 MG per tab 1 tablet 07/16/19 2105 Given      [id number] HYDROcodone-acetaminophen (Olga Linh) MCH 30.8 26.0 - 34.0 pg — [facility]   MCHC 31.8 31.0 - 37.0 g/dL — [facility]   RDW 13.6 11.0 - 15.0 % — [facility]   RDW-SD 48.8 35.1 - 46.3 fL [facility]            Testing Performed By     Lab - Abbreviation Name Director Address Valid Date Range Orders placed  Per Dr Mcadams [place] CT needs to be done before surgery so he can see results          Addendum Note   Katherine Patten, RN (Registered Nurse) • • Registered Nurse      Addended by: Celine Talamantes on: 6/26/2019 02:49 PM       Modules accepted: Or Nose nasal mucosa septum and turbinates unremarkable  Oropharynx without erythema or crowding  Neck is supple  trachea is midline  Lungs clear to auscultation good respiratory effort  Heart S1-S2 without murmur gallop or rubs  Abdomen soft without mass  No   HGB 8.8 (L) 07/09/2016            Lab Results   Component Value Date      06/26/2019      11/30/2016     .0 (L) 07/10/2016      Thyroid:          Lab Results   Component Value Date     TSH 6.343 (H) 04/01/2019     TSH 7.244 (H) 12/21 Past Surgical History:   Procedure Laterality Date   • ANESTH,SURGERY OF SHOULDER Bilateral     • CARPAL TUNNEL RELEASE Right     • COLONOSCOPY       • HIP JOINT INJECTION Left 4/16/2019     Performed by Pari Dumont MD at [facility] ENDOSCOPY   • HIP S -     COMP METABOLIC PANEL (14); Future  -     PT AND PTT; Future  -     URINALYSIS WITH CULTURE REFLEX; Future  -     OFFICE/OUTPT VISIT,EST,LEVL IV  -     CBC, PLATELET; NO DIFFERENTIAL;  Future  -     PROTHROMBIN TIME (PT)  -     PTT, ACTIVATED  -     CA See patient instructions  Patient understood above plan and agreed.     Return to Office: Return if symptoms worsen or fail to improve.               Future Appointments   Date Time Provider Kaylin Leblanc   7/11/2019 10:45 AM MD CLARA Trevizo Consults - MD Consult Notes      Consults signed by Sherri Eason MD at 7/16/2019  9:36 AM     Author:  Sherri Eason MD Service:  Hospitalist Author Type:  Physician    Filed:  7/16/2019  9:36 AM Date of Service:  7/15/2019  3:24 PM Status Procedure Laterality Date   • ANESTH,SURGERY OF SHOULDER Bilateral    • CARPAL TUNNEL RELEASE Right    • COLONOSCOPY     • HIP JOINT INJECTION Left 4/16/2019    Performed by Trenton Harkins MD at [address] [place] [address] ENDOSCOPY   • HIP SURGERY Right 7/8/16--Dr. May Chaney organomegaly. Non distended   Extremities: Dressing c/d/i. Skin: Skin color, texture, turgor normal. No rashes or lesions.     Neurologic: Normal strength, no focal deficit appreciated     Laboratory:  No results for input(s): WBC, HGB, MCV, PLT, BAND, :  Alan Ford PTA (Physical Therapy Assistant)       PHYSICAL THERAPY HIP TREATMENT NOTE - INPATIENT      Room Number: 365/365-A     Session: 3  Number of Visits to Meet Established Goals: 3    Presenting Problem: s/p L THR    Problem List L Lower Extremity: Weight Bearing as Tolerated    PAIN ASSESSMENT   Ratin  Location: L hip  Management Techniques: Body mechanics; Activity promotion;Breathing techniques;Repositioning    BALANCE  Static Sitting: Fair -  Dynamic Sitting: Fair -  Static Sitting Knee Extension 20 reps 0 reps   Standing heel/toe raises 20 reps 0 reps   Hamstring Curls 20 reps 0 reps   Forward, back steps 20 reps 0 reps   Short Squats 20 reps 0 reps     Comments:  Pt participated in group session, tolerance was good.    Physical Therapy Note signed by María Ziegler PTA at 7/16/2019  2:52 PM  Version 1 of 1    Author:  Mraía Ziegler PTA Service:  Rehab Author Type:  Physical Therapy Assistant    Filed:  7/16/2019  2:52 PM Date of Service:  7/16/2019  2:49 PM Status \"I am not doing as well as I hoped\"    Patient’s self-stated goal is - go to [address]    OBJECTIVE[AR.1]  Precautions: ALINE - anterior;Knee immobilizer[AR.2]    WEIGHT BEARING RESTRICTION[AR.1]  Weight Bearing Restriction: L lower extremity able to tolerate beginning BLE strengthening to help achieve greater strength/flexibility. Pt given max encouragement to participate in short distance gait - see flowsheet.      PM: Pt completed increased repetitions in PM, and required less VC's for align continued IP PT, so that patient may achieve highest functional independence/return to baseline.  Recommend MAXIMILIANO upon [facility] d/c.        DISCHARGE RECOMMENDATIONS[AR.1]  PT Discharge Recommendations: Sub-acute rehabilitation[AR.2]    PLAN[AR.1]  PT with anterior hip precautions. Pt had the R hip replaced in July of 2016. Problem List  Active Problems:    * No active hospital problems.  *      Past Medical History  Past Medical History:   Diagnosis Date   • Anesthesia complication     difficult time d L Lower Extremity: Weight Bearing as Tolerated    PAIN ASSESSMENT  Ratin  Location: L hip  Management Techniques:  Activity promotion;Repositioning    COGNITION  · Overall Cognitive Status:  WFL - within functional limits    RANGE OF MOTION AND STRENGTH of PT, POC and D/C plans. Both verbalized understanding. Pt instructed in anterior hip precautions. Pt supine to sit with min A to EOB. Pt sat EOB x 7 min with sup assessing strength in L LE and pt felt lightheaded. BP was 122/71.  Pt able to scoot up in be PT Treatment Plan: Bed mobility; Endurance; Patient education; Family education;Gait training;Strengthening;Stoop training;Transfer training;Balance training  Rehab Potential : Good  Frequency (Obs): BID  Number of Visits to Meet Established Goals: Ana OCCUPATIONAL THERAPY EVALUATION - INPATIENT     Room Number: 365/365-A   Evaluation Date: 7/16/2019  Type of Evaluation: Initial  Presenting Problem: s/p L ALINE 7/15/19    Physician Order: IP Consult to Occupational Therapy  Reason for Therapy: ADL/IADL Toilet and Equipment: Comfort height toilet  Shower/Tub and Equipment: Walk-in shower; Shower chair;Grab bar  Other Equipment: Reacher;Sock aid    Occupation/Status: Retired     Drives: Yes  Patient Regularly Uses: Glasses; Hearing aides    Prior Level of Fu -   Taking care of personal grooming such as brushing teeth?: A Little(setup)  -   Eating meals?: A Little(setup)    AM-PAC Score:  Score: 16  Approx Degree of Impairment: 53.32%  Standardized Score (AM-PAC Scale): 35.96  CMS Modifier (G-Code): ALEM IVY daily living, rest and sleep, work, leisure and social participation. Results of the AM-PAC \"6 clicks\" Inpatient Activities of Daily Living Short Form for the patient is 53.32% degree of basic ADL impairment.  Research supports that patients with this lev Patient will transfer Supine to Sit with Mod I  Patient will transfer Sit to Supine with Mod I  Patient will transfer to Toilet with supervision    ADDITIONAL GOALS   Patient will recall all hip precautions and incorporate into ADL tasks[BW.3]     Attribut

## (undated) NOTE — Clinical Note
I saw Wendall Shone in the [employer] in [facility] today for acute sinusitis,  she was treated with cefdinir and flonase. Wendall Shone will follow up with you if no better or as needed.  Thank you for the opportunity to care for [facility] TADEO EDEN